# Patient Record
Sex: MALE | Race: WHITE | NOT HISPANIC OR LATINO | Employment: FULL TIME | URBAN - METROPOLITAN AREA
[De-identification: names, ages, dates, MRNs, and addresses within clinical notes are randomized per-mention and may not be internally consistent; named-entity substitution may affect disease eponyms.]

---

## 2017-11-17 ENCOUNTER — OFFICE VISIT (OUTPATIENT)
Dept: UROLOGY | Facility: CLINIC | Age: 37
End: 2017-11-17
Payer: COMMERCIAL

## 2017-11-17 VITALS
SYSTOLIC BLOOD PRESSURE: 134 MMHG | DIASTOLIC BLOOD PRESSURE: 82 MMHG | RESPIRATION RATE: 18 BRPM | BODY MASS INDEX: 24.76 KG/M2 | WEIGHT: 157.75 LBS | HEIGHT: 67 IN | HEART RATE: 73 BPM

## 2017-11-17 DIAGNOSIS — Z30.09 VISIT FOR VASECTOMY EVALUATION: Primary | ICD-10-CM

## 2017-11-17 PROCEDURE — 99203 OFFICE O/P NEW LOW 30 MIN: CPT | Mod: 57,S$GLB,, | Performed by: UROLOGY

## 2017-11-17 PROCEDURE — 99999 PR PBB SHADOW E&M-EST. PATIENT-LVL IV: CPT | Mod: PBBFAC,,, | Performed by: UROLOGY

## 2017-11-18 NOTE — PROGRESS NOTES
[facility] Urology New Patient/H&P:    Ismael Davis is a 36 y.o. male who presents for evaluation for vasectomy.     His wife is 33 years old, and they have been  for 8 years. They have 2 girls, ages 3.5 and ~6 mos.   His wife had multiple medical complications during her last pregnancy including low Mg, low K, Afib. She is still undergoing cardiac workup.  He has discussed vasectomy at length with his wife.     Prior colon surgery for Crohns but othwerwise no sig PMHx and follows up with Dr Hudson for primary care.    Past Medical History:   Diagnosis Date    Crohn's disease        Past Surgical History:   Procedure Laterality Date    ABDOMINAL SURGERY         History reviewed. No pertinent family history.    Social History     Social History    Marital status:      Spouse name: N/A    Number of children: N/A    Years of education: N/A     Occupational History    Not on file.     Social History Main Topics    Smoking status: Never Smoker    Smokeless tobacco: Never Used    Alcohol use No    Drug use: No    Sexual activity: Not on file     Other Topics Concern    Not on file     Social History Narrative    No narrative on file       Review of patient's allergies indicates:   Allergen Reactions    Penicillins        Medications Reviewed: see MAR    ROS:    Constitutional: denies fevers, chills, night sweats, fatigue, malaise  Respiratory: negative for cough, shortness of breath, wheezing, dyspnea.  Cardiovascular: negative for chest pain, varicose veins, ankle swelling, palpitations, syncope.  GI: negative for abdominal pain, heartburn, indigestion, nausea, vomiting, constipation, diarrhea, blood in stool.   Urology: as noted above in HPI  Endocrinology: negative for cold intolerance, excessive thirst, not feeling tired/sluggish, no heat intolerance.   Hematology/Lymph: negative for easy bleeding, easy bruising, swollen glands.  Musculoskeletal: negative for back pain, joint pain, joint  "swelling, neck pain.  Allergy-Immunology: negative for seasonal allergies, negative for unusual infections.   Skin: negative for boils, breast lumps, hives, itching, rash.   Neurology: negative for, dizziness, headache, tingling/numbness, tremors.   Psych: satisfied with life; negative for, anxiety, depression, suicidal thoughts.     PHYSICAL EXAM:    Vitals:    11/17/17 0926   BP: 134/82   Pulse: 73   Resp: 18     Body mass index is 24.71 kg/m². Weight: 71.6 kg (157 lb 11.8 oz) Height: 5' 7" (170.2 cm)       General: Alert, cooperative, no distress, appears stated age  Head: Normocephalic, without obvious abnormality, atraumatic  Neck: no masses, no thyromegaly, no lymphadenopathy  Eyes: PERRL, conjunctiva/corneas clear  Lungs: Respirations unlabored, normal effort, no accessory muscle use  CV: Warm and well perfused extremities  Abdomen: Soft, non-tender, no CVA tenderness, no hepatosplenomegaly, no hernia  Penis: phallus normal, circumcised, well cared for, no plaques or lesions.   Scrotum: no cysts, no lesions, no rash, no hydrocele. Prominently palpable bilateral vasa  Epididymes: normal, nontender, symmetrical, no masses or cysts.   Testes: normal, both descended, no masses.   Urethra: palpably normal with orthotopic meatus of normal size    Extremities: Extremities normal, atraumatic, no cyanosis or edema  Skin: Normal color, texture, and turgor, no rashes or lesions  Psych: Appropriate, well oriented, normal affect, normal mood  Neuro: Non-focal      Assessment/Diagnosis:    1. Visit for vasectomy evaluation  Diet NPO    Case Request Operating Room: VASECTOMY    Place in Outpatient    Vital Signs     Activity as tolerated    Insert peripheral IV    Diet NPO       Plans:  I discussed vasectomy in detail with Mr. Davis. This visit was about 80% spent in counseling, including procedure in detail, risks and beneifts, and expectations for post-vasectomy. Also had discussion about the time frame of approximately " 6-9 weeks on average, but up to 3-4 months to achieve a negative semenalysis before being cleared to discontinue other contraceptive methods. I did note if 2 semenalyses (6 wks and 8 wks) are negative he will be cleared to do so. Risks including but not limited to including pain, infection, bleeding hematoma, sperm granuloma, epididymitis, and recanalization were discussed, including discussion about maneuvers performed to eliminate risk of recanalization (excise segment, suture ligate and cauterize both ends, and bury one end at different fascial level in scrotum). All questions answered, pre-procedural instructions and postop expectations provided. Vasectomy scheduled at [facility] at 12/15/17

## 2017-11-27 ENCOUNTER — PATIENT MESSAGE (OUTPATIENT)
Dept: SURGERY | Facility: AMBULARY SURGERY CENTER | Age: 37
End: 2017-11-27

## 2017-12-14 ENCOUNTER — ANESTHESIA EVENT (OUTPATIENT)
Dept: SURGERY | Facility: AMBULARY SURGERY CENTER | Age: 37
End: 2017-12-14
Payer: COMMERCIAL

## 2017-12-15 ENCOUNTER — ANESTHESIA (OUTPATIENT)
Dept: SURGERY | Facility: AMBULARY SURGERY CENTER | Age: 37
End: 2017-12-15
Payer: COMMERCIAL

## 2017-12-15 ENCOUNTER — HOSPITAL ENCOUNTER (OUTPATIENT)
Facility: AMBULARY SURGERY CENTER | Age: 37
Discharge: HOME | End: 2017-12-15
Attending: UROLOGY | Admitting: UROLOGY
Payer: COMMERCIAL

## 2017-12-15 DIAGNOSIS — Z30.09 VISIT FOR VASECTOMY EVALUATION: ICD-10-CM

## 2017-12-15 PROCEDURE — 55250 REMOVAL OF SPERM DUCT(S): CPT | Performed by: UROLOGY

## 2017-12-15 PROCEDURE — D9220A PRA ANESTHESIA: Mod: CRNA,,, | Performed by: NURSE ANESTHETIST, CERTIFIED REGISTERED

## 2017-12-15 PROCEDURE — 88302 TISSUE EXAM BY PATHOLOGIST: CPT | Performed by: PATHOLOGY

## 2017-12-15 PROCEDURE — 55250 REMOVAL OF SPERM DUCT(S): CPT | Mod: ,,, | Performed by: UROLOGY

## 2017-12-15 PROCEDURE — D9220A PRA ANESTHESIA: Mod: ANES,,, | Performed by: ANESTHESIOLOGY

## 2017-12-15 RX ORDER — FENTANYL CITRATE 50 UG/ML
INJECTION, SOLUTION INTRAMUSCULAR; INTRAVENOUS
Status: COMPLETED
Start: 2017-12-15 | End: 2017-12-15

## 2017-12-15 RX ORDER — FENTANYL CITRATE 50 UG/ML
25 INJECTION, SOLUTION INTRAMUSCULAR; INTRAVENOUS EVERY 5 MIN PRN
Status: DISCONTINUED | OUTPATIENT
Start: 2017-12-15 | End: 2017-12-15 | Stop reason: HOSPADM

## 2017-12-15 RX ORDER — FENTANYL CITRATE 50 UG/ML
INJECTION, SOLUTION INTRAMUSCULAR; INTRAVENOUS
Status: DISCONTINUED | OUTPATIENT
Start: 2017-12-15 | End: 2017-12-15

## 2017-12-15 RX ORDER — MIDAZOLAM HYDROCHLORIDE 1 MG/ML
INJECTION, SOLUTION INTRAMUSCULAR; INTRAVENOUS
Status: DISCONTINUED | OUTPATIENT
Start: 2017-12-15 | End: 2017-12-15

## 2017-12-15 RX ORDER — SODIUM CHLORIDE, SODIUM LACTATE, POTASSIUM CHLORIDE, CALCIUM CHLORIDE 600; 310; 30; 20 MG/100ML; MG/100ML; MG/100ML; MG/100ML
INJECTION, SOLUTION INTRAVENOUS CONTINUOUS
Status: DISCONTINUED | OUTPATIENT
Start: 2017-12-15 | End: 2017-12-15 | Stop reason: HOSPADM

## 2017-12-15 RX ORDER — ONDANSETRON 2 MG/ML
4 INJECTION INTRAMUSCULAR; INTRAVENOUS ONCE AS NEEDED
Status: DISCONTINUED | OUTPATIENT
Start: 2017-12-15 | End: 2017-12-15 | Stop reason: HOSPADM

## 2017-12-15 RX ORDER — ONDANSETRON HYDROCHLORIDE 2 MG/ML
INJECTION, SOLUTION INTRAMUSCULAR; INTRAVENOUS
Status: DISCONTINUED | OUTPATIENT
Start: 2017-12-15 | End: 2017-12-15

## 2017-12-15 RX ORDER — ONDANSETRON 8 MG/1
8 TABLET, ORALLY DISINTEGRATING ORAL ONCE
Status: COMPLETED | OUTPATIENT
Start: 2017-12-15 | End: 2017-12-15

## 2017-12-15 RX ORDER — KETOROLAC TROMETHAMINE 30 MG/ML
INJECTION, SOLUTION INTRAMUSCULAR; INTRAVENOUS
Status: COMPLETED
Start: 2017-12-15 | End: 2017-12-15

## 2017-12-15 RX ORDER — SODIUM CHLORIDE, SODIUM LACTATE, POTASSIUM CHLORIDE, CALCIUM CHLORIDE 600; 310; 30; 20 MG/100ML; MG/100ML; MG/100ML; MG/100ML
1000 INJECTION, SOLUTION INTRAVENOUS ONCE
Status: DISCONTINUED | OUTPATIENT
Start: 2017-12-15 | End: 2017-12-15 | Stop reason: HOSPADM

## 2017-12-15 RX ORDER — LIDOCAINE HYDROCHLORIDE 20 MG/ML
INJECTION, SOLUTION EPIDURAL; INFILTRATION; INTRACAUDAL; PERINEURAL
Status: DISCONTINUED
Start: 2017-12-15 | End: 2017-12-15 | Stop reason: HOSPADM

## 2017-12-15 RX ORDER — KETOROLAC TROMETHAMINE 30 MG/ML
INJECTION, SOLUTION INTRAMUSCULAR; INTRAVENOUS
Status: DISCONTINUED | OUTPATIENT
Start: 2017-12-15 | End: 2017-12-15

## 2017-12-15 RX ORDER — LIDOCAINE HYDROCHLORIDE 10 MG/ML
1 INJECTION, SOLUTION EPIDURAL; INFILTRATION; INTRACAUDAL; PERINEURAL ONCE
Status: COMPLETED | OUTPATIENT
Start: 2017-12-15 | End: 2017-12-15

## 2017-12-15 RX ORDER — MIDAZOLAM HYDROCHLORIDE 1 MG/ML
INJECTION INTRAMUSCULAR; INTRAVENOUS
Status: COMPLETED
Start: 2017-12-15 | End: 2017-12-15

## 2017-12-15 RX ORDER — PROPOFOL 10 MG/ML
VIAL (ML) INTRAVENOUS
Status: DISCONTINUED | OUTPATIENT
Start: 2017-12-15 | End: 2017-12-15

## 2017-12-15 RX ORDER — DEXAMETHASONE SODIUM PHOSPHATE 4 MG/ML
INJECTION, SOLUTION INTRA-ARTICULAR; INTRALESIONAL; INTRAMUSCULAR; INTRAVENOUS; SOFT TISSUE
Status: COMPLETED
Start: 2017-12-15 | End: 2017-12-15

## 2017-12-15 RX ORDER — DEXAMETHASONE SODIUM PHOSPHATE 4 MG/ML
INJECTION, SOLUTION INTRA-ARTICULAR; INTRALESIONAL; INTRAMUSCULAR; INTRAVENOUS; SOFT TISSUE
Status: DISCONTINUED | OUTPATIENT
Start: 2017-12-15 | End: 2017-12-15

## 2017-12-15 RX ORDER — ONDANSETRON 2 MG/ML
INJECTION INTRAMUSCULAR; INTRAVENOUS
Status: COMPLETED
Start: 2017-12-15 | End: 2017-12-15

## 2017-12-15 RX ORDER — TRAMADOL HYDROCHLORIDE 50 MG/1
50 TABLET ORAL EVERY 6 HOURS PRN
Qty: 15 TABLET | Refills: 0 | Status: SHIPPED | OUTPATIENT
Start: 2017-12-15 | End: 2017-12-22

## 2017-12-15 RX ORDER — MUPIROCIN 20 MG/G
OINTMENT TOPICAL
Status: DISCONTINUED | OUTPATIENT
Start: 2017-12-15 | End: 2017-12-15 | Stop reason: HOSPADM

## 2017-12-15 RX ORDER — SULFAMETHOXAZOLE AND TRIMETHOPRIM 800; 160 MG/1; MG/1
1 TABLET ORAL 2 TIMES DAILY
Qty: 10 TABLET | Refills: 0 | Status: SHIPPED | OUTPATIENT
Start: 2017-12-15 | End: 2017-12-20

## 2017-12-15 RX ORDER — CLINDAMYCIN PHOSPHATE 900 MG/50ML
INJECTION, SOLUTION INTRAVENOUS
Status: COMPLETED
Start: 2017-12-15 | End: 2017-12-15

## 2017-12-15 RX ORDER — CLINDAMYCIN PHOSPHATE 900 MG/50ML
900 INJECTION, SOLUTION INTRAVENOUS
Status: COMPLETED | OUTPATIENT
Start: 2017-12-15 | End: 2017-12-15

## 2017-12-15 RX ORDER — PROPOFOL 10 MG/ML
INJECTION, EMULSION INTRAVENOUS
Status: COMPLETED
Start: 2017-12-15 | End: 2017-12-15

## 2017-12-15 RX ORDER — LIDOCAINE HCL/PF 100 MG/5ML
SYRINGE (ML) INTRAVENOUS
Status: DISCONTINUED | OUTPATIENT
Start: 2017-12-15 | End: 2017-12-15

## 2017-12-15 RX ADMIN — KETOROLAC TROMETHAMINE 30 MG: 30 INJECTION, SOLUTION INTRAMUSCULAR; INTRAVENOUS at 10:12

## 2017-12-15 RX ADMIN — ONDANSETRON 8 MG: 8 TABLET, ORALLY DISINTEGRATING ORAL at 11:12

## 2017-12-15 RX ADMIN — DEXAMETHASONE SODIUM PHOSPHATE 4 MG: 4 INJECTION, SOLUTION INTRA-ARTICULAR; INTRALESIONAL; INTRAMUSCULAR; INTRAVENOUS; SOFT TISSUE at 09:12

## 2017-12-15 RX ADMIN — FENTANYL CITRATE 50 MCG: 50 INJECTION, SOLUTION INTRAMUSCULAR; INTRAVENOUS at 09:12

## 2017-12-15 RX ADMIN — ONDANSETRON HYDROCHLORIDE 4 MG: 2 INJECTION, SOLUTION INTRAMUSCULAR; INTRAVENOUS at 09:12

## 2017-12-15 RX ADMIN — Medication 75 MG: at 09:12

## 2017-12-15 RX ADMIN — Medication 150 MG: at 09:12

## 2017-12-15 RX ADMIN — MIDAZOLAM HYDROCHLORIDE 2 MG: 1 INJECTION, SOLUTION INTRAMUSCULAR; INTRAVENOUS at 09:12

## 2017-12-15 RX ADMIN — CLINDAMYCIN PHOSPHATE 900 MG: 900 INJECTION, SOLUTION INTRAVENOUS at 09:12

## 2017-12-15 RX ADMIN — SODIUM CHLORIDE, SODIUM LACTATE, POTASSIUM CHLORIDE, CALCIUM CHLORIDE: 600; 310; 30; 20 INJECTION, SOLUTION INTRAVENOUS at 08:12

## 2017-12-15 RX ADMIN — LIDOCAINE HYDROCHLORIDE 0.2 ML: 10 INJECTION, SOLUTION EPIDURAL; INFILTRATION; INTRACAUDAL; PERINEURAL at 08:12

## 2017-12-15 NOTE — DISCHARGE INSTRUCTIONS
Anesthesia: After Your Surgery  Youve just had surgery. During surgery, you received medication called anesthesia to keep you comfortable and pain-free. After surgery, you may experience some pain or nausea. This is normal. Here are some tips for feeling better and recovering after surgery.         Stay on schedule with your medication.   Going Home  Your doctor or nurse will show you how to take care of yourself when you go home. He or she will also answer your questions. Have an adult family member or friend drive you home. For the first 24 hours after your surgery:  · Do not drive or use heavy equipment.  · Do not make important decisions or sign documents.  · Avoid alcohol.  · Have someone stay with you, if possible. They can watch for problems and help keep you safe.  Be sure to keep all follow-up doctors appointments. And rest after your procedure for as long as your doctor tells you to.  Coping with Pain  If you have pain after surgery, pain medication will help you feel better. Take it as directed, before pain becomes severe. Also, ask your doctor or pharmacist about other ways to control pain, such as with heat, ice, and relaxation. And follow any other instructions your surgeon or nurse gives you.  Tips for Taking Pain Medication  To get the best relief possible, remember these points:  · Pain medications can upset your stomach. Taking them with a little food may help.  · Most pain relievers taken by mouth need at least 20 to 30 minutes to take effect.  · Taking medication on a schedule can help you remember to take it. Try to time your medication so that you can take it before beginning an activity, such as dressing, walking, or sitting down for dinner.  · Constipation is a common side effect of pain medications. Drink lots of fluids. Eating fruit and vegetables can also help. Dont take laxatives unless your surgeon has prescribed them.  · Mixing alcohol and pain medication can cause dizziness and slow  your breathing. It can even be fatal. Dont drink alcohol while taking pain medication.  · Pain medication can slow your reflexes. Dont drive or operate machinery while taking pain medication.  Managing Nausea  Some people have an upset stomach after surgery. This is often due to anesthesia, pain, pain medications, or the stress of surgery. The following tips will help you manage nausea and get good nutrition as you recover. If you were on a special diet before surgery, ask your doctor if you should follow it during recovery. These tips may help:  · Dont push yourself to eat. Your body will tell you what to eat and when.  · Start off with liquids and soup. They are easier to digest.  · Progress to semisolids (mashed potatoes, applesauce, and gelatin) as you feel ready.  · Slowly move to solid foods. Dont eat fatty, rich, or spicy foods at first.  · Dont force yourself to have three large meals a day. Instead, eat smaller amounts more often.  · Take pain medications with a small amount of solid food, such as crackers or toast.  Call Your Surgeon If  · You still have pain an hour after taking medication (it may not be strong enough).  · You feel too sleepy, dizzy, or groggy (medication may be too strong).  · You have side effects like nausea, vomiting, or skin changes (rash, itching, or hives).

## 2017-12-15 NOTE — TRANSFER OF CARE
"Anesthesia Transfer of Care Note    Patient: Ismael Davis    Procedure(s) Performed: Procedure(s) (LRB):  VASECTOMY (Bilateral)    Patient location: PACU    Anesthesia Type: general    Transport from OR: Transported from OR on room air with adequate spontaneous ventilation    Post pain: adequate analgesia    Post assessment: no apparent anesthetic complications and tolerated procedure well    Post vital signs: stable    Level of consciousness: awake, alert and oriented    Nausea/Vomiting: no nausea/vomiting    Complications: none    Transfer of care protocol was followed      Last vitals:   Visit Vitals  /75 (BP Location: Right arm, Patient Position: Lying)   Pulse 68   Temp 36.6 °C (97.8 °F) (Oral)   Resp 18   Ht 5' 7" (1.702 m)   Wt 71.2 kg (157 lb)   SpO2 100%   BMI 24.59 kg/m²     "

## 2017-12-15 NOTE — OP NOTE
VASECTOMY Operative Report/Brief Discharge Note     Surgery Date: 12/15/17     SURGEON: Dr. Zhang Liao     DIAGNOSIS: Desired sterilization.     OPERATION: Bilateral vastectomy.     TECHNIQUE: 2 incisions using scalpel/sharp hemostat overlying vas upper lateral scrotum. Ring clamp used to isolate vas deferens and perivasal tissue dissected free with sharp hemostat and metzenbaum scissors until only vas deferens isolated by ring forceps. Vasculature inferior dissected free and an approximate 1 inch segment was isolated with 2 hemostats, and transected with bovie electrocautery which was used as well to cauterize the stump lumen. 2-0 chromic stick tie used to suture ligate the stumps, and then to bury one end. Assessed for bleeding, spot cautery used, and noted to be hemostatic. Skin incisions closed with 4-0 chromic figure of eight suture.      ANESTHESIA: general lma  FINDINGS: Normal vas bilaterally.  COMPLICATIONS: None  EBL: none  SPECIMENS: 1. Right vas deferens, 2. Left vas deferens     DISCHARGE:  DISPO: Home today   PRECAUTION DISCUSSED: Rest, ice pack, scrotal support, no ejaculation for 3 days, activity restrictions, and use protected intercourse until negative semenalyses. May shower in 24 hours, no baths/soaking.  DIET - resume regular diet  FOLLOW UP - 1-2 weeks postop check, then 6 weeks for first semenalysis   POST OP MEDICATIONS: bactrim bid and tramadol + home meds

## 2017-12-15 NOTE — PLAN OF CARE
Patient is being driven home by his wife. He was sent home with a reusable ice pack. Nausea improved.

## 2017-12-15 NOTE — H&P (VIEW-ONLY)
[facility] Urology New Patient/H&P:    Ismael Davis is a 36 y.o. male who presents for evaluation for vasectomy.     His wife is 33 years old, and they have been  for 8 years. They have 2 girls, ages 3.5 and ~6 mos.   His wife had multiple medical complications during her last pregnancy including low Mg, low K, Afib. She is still undergoing cardiac workup.  He has discussed vasectomy at length with his wife.     Prior colon surgery for Crohns but othwerwise no sig PMHx and follows up with Dr Hudson for primary care.    Past Medical History:   Diagnosis Date    Crohn's disease        Past Surgical History:   Procedure Laterality Date    ABDOMINAL SURGERY         History reviewed. No pertinent family history.    Social History     Social History    Marital status:      Spouse name: N/A    Number of children: N/A    Years of education: N/A     Occupational History    Not on file.     Social History Main Topics    Smoking status: Never Smoker    Smokeless tobacco: Never Used    Alcohol use No    Drug use: No    Sexual activity: Not on file     Other Topics Concern    Not on file     Social History Narrative    No narrative on file       Review of patient's allergies indicates:   Allergen Reactions    Penicillins        Medications Reviewed: see MAR    ROS:    Constitutional: denies fevers, chills, night sweats, fatigue, malaise  Respiratory: negative for cough, shortness of breath, wheezing, dyspnea.  Cardiovascular: negative for chest pain, varicose veins, ankle swelling, palpitations, syncope.  GI: negative for abdominal pain, heartburn, indigestion, nausea, vomiting, constipation, diarrhea, blood in stool.   Urology: as noted above in HPI  Endocrinology: negative for cold intolerance, excessive thirst, not feeling tired/sluggish, no heat intolerance.   Hematology/Lymph: negative for easy bleeding, easy bruising, swollen glands.  Musculoskeletal: negative for back pain, joint pain, joint  "swelling, neck pain.  Allergy-Immunology: negative for seasonal allergies, negative for unusual infections.   Skin: negative for boils, breast lumps, hives, itching, rash.   Neurology: negative for, dizziness, headache, tingling/numbness, tremors.   Psych: satisfied with life; negative for, anxiety, depression, suicidal thoughts.     PHYSICAL EXAM:    Vitals:    11/17/17 0926   BP: 134/82   Pulse: 73   Resp: 18     Body mass index is 24.71 kg/m². Weight: 71.6 kg (157 lb 11.8 oz) Height: 5' 7" (170.2 cm)       General: Alert, cooperative, no distress, appears stated age  Head: Normocephalic, without obvious abnormality, atraumatic  Neck: no masses, no thyromegaly, no lymphadenopathy  Eyes: PERRL, conjunctiva/corneas clear  Lungs: Respirations unlabored, normal effort, no accessory muscle use  CV: Warm and well perfused extremities  Abdomen: Soft, non-tender, no CVA tenderness, no hepatosplenomegaly, no hernia  Penis: phallus normal, circumcised, well cared for, no plaques or lesions.   Scrotum: no cysts, no lesions, no rash, no hydrocele. Prominently palpable bilateral vasa  Epididymes: normal, nontender, symmetrical, no masses or cysts.   Testes: normal, both descended, no masses.   Urethra: palpably normal with orthotopic meatus of normal size    Extremities: Extremities normal, atraumatic, no cyanosis or edema  Skin: Normal color, texture, and turgor, no rashes or lesions  Psych: Appropriate, well oriented, normal affect, normal mood  Neuro: Non-focal      Assessment/Diagnosis:    1. Visit for vasectomy evaluation  Diet NPO    Case Request Operating Room: VASECTOMY    Place in Outpatient    Vital Signs     Activity as tolerated    Insert peripheral IV    Diet NPO       Plans:  I discussed vasectomy in detail with Mr. Davis. This visit was about 80% spent in counseling, including procedure in detail, risks and beneifts, and expectations for post-vasectomy. Also had discussion about the time frame of approximately " 6-9 weeks on average, but up to 3-4 months to achieve a negative semenalysis before being cleared to discontinue other contraceptive methods. I did note if 2 semenalyses (6 wks and 8 wks) are negative he will be cleared to do so. Risks including but not limited to including pain, infection, bleeding hematoma, sperm granuloma, epididymitis, and recanalization were discussed, including discussion about maneuvers performed to eliminate risk of recanalization (excise segment, suture ligate and cauterize both ends, and bury one end at different fascial level in scrotum). All questions answered, pre-procedural instructions and postop expectations provided. Vasectomy scheduled at [facility] at 12/15/17

## 2017-12-15 NOTE — ANESTHESIA PREPROCEDURE EVALUATION
12/15/2017  Ismael Davis is a 37 y.o., male.    Anesthesia Evaluation    I have reviewed the Patient Summary Reports.    I have reviewed the Nursing Notes.   I have reviewed the Medications.     Review of Systems  Anesthesia Hx:  No problems with previous Anesthesia    Social:  Non-Smoker    Hematology/Oncology:  Hematology Normal   Oncology Normal     EENT/Dental:EENT/Dental Normal   Cardiovascular:  Cardiovascular Normal     Pulmonary:  Pulmonary Normal    Renal/:  Renal/ Normal     Hepatic/GI:   Crohn's disease    Musculoskeletal:  Musculoskeletal Normal    Neurological:  Neurology Normal    Endocrine:  Endocrine Normal    Dermatological:  Skin Normal    Psych:  Psychiatric Normal           Physical Exam  General:  Well nourished    Airway/Jaw/Neck:  Airway Findings: Mouth Opening: Normal Tongue: Normal  General Airway Assessment: Adult  Mallampati: I        Eyes/Ears/Nose:  EYES/EARS/NOSE FINDINGS: Normal   Dental:  DENTAL FINDINGS: Normal   Chest/Lungs:  Chest/Lungs Findings: Clear to auscultation, Normal Respiratory Rate     Heart/Vascular:  Heart Findings: Rate: Normal  Rhythm: Regular Rhythm  Sounds: Normal        Mental Status:  Mental Status Findings: Normal        Anesthesia Plan  Type of Anesthesia, risks & benefits discussed:  Anesthesia Type:  general  Patient's Preference:   Intra-op Monitoring Plan: standard ASA monitors  Intra-op Monitoring Plan Comments:   Post Op Pain Control Plan: IV/PO Opioids PRN  Post Op Pain Control Plan Comments:   Induction:   IV  Beta Blocker:  Patient is not currently on a Beta-Blocker (No further documentation required).       Informed Consent: Patient understands risks and agrees with Anesthesia plan.  Questions answered. Anesthesia consent signed with patient.  ASA Score: 2     Day of Surgery Review of History & Physical: I have interviewed and  examined the patient. I have reviewed the patient's H&P dated:  There are no significant changes.          Ready For Surgery From Anesthesia Perspective.

## 2017-12-15 NOTE — INTERVAL H&P NOTE
The patient has been examined and the H&P has been reviewed:    No changes to above  All questions patient and wife had were answered in preop holding  Patient is acceptable candidate for procedure at [facility]  Proceed with vasectomy    Anesthesia/Surgery risks, benefits and alternative options discussed and understood by patient/family.          Active Hospital Problems    Diagnosis  POA    Visit for vasectomy evaluation [Z30.09]  Yes      Resolved Hospital Problems    Diagnosis Date Resolved POA   No resolved problems to display.

## 2017-12-15 NOTE — ANESTHESIA POSTPROCEDURE EVALUATION
"Anesthesia Post Evaluation    Patient: Ismael Davis    Procedure(s) Performed: Procedure(s) (LRB):  VASECTOMY (Bilateral)    Final Anesthesia Type: general  Patient location during evaluation: PACU  Patient participation: Yes- Able to Participate  Level of consciousness: awake and alert  Post-procedure vital signs: reviewed and stable  Pain management: adequate  Airway patency: patent  PONV status at discharge: No PONV  Anesthetic complications: no      Cardiovascular status: hemodynamically stable  Respiratory status: unassisted and room air  Hydration status: euvolemic  Follow-up not needed.        Visit Vitals  BP (!) 98/56   Pulse 75   Temp 36.7 °C (98 °F) (Skin)   Resp 13   Ht 5' 7" (1.702 m)   Wt 71.2 kg (157 lb)   SpO2 98%   BMI 24.59 kg/m²       Pain/Atif Score: Pain Assessment Performed: Yes (12/15/2017 10:50 AM)  Presence of Pain: denies (12/15/2017 10:50 AM)  Atif Score: 10 (12/15/2017 10:50 AM)      "

## 2017-12-18 VITALS
DIASTOLIC BLOOD PRESSURE: 79 MMHG | TEMPERATURE: 98 F | OXYGEN SATURATION: 97 % | HEIGHT: 67 IN | HEART RATE: 70 BPM | SYSTOLIC BLOOD PRESSURE: 119 MMHG | RESPIRATION RATE: 15 BRPM | BODY MASS INDEX: 24.64 KG/M2 | WEIGHT: 157 LBS

## 2017-12-22 ENCOUNTER — OFFICE VISIT (OUTPATIENT)
Dept: UROLOGY | Facility: CLINIC | Age: 37
End: 2017-12-22
Payer: COMMERCIAL

## 2017-12-22 VITALS
SYSTOLIC BLOOD PRESSURE: 131 MMHG | HEART RATE: 82 BPM | BODY MASS INDEX: 24.63 KG/M2 | HEIGHT: 67 IN | WEIGHT: 156.94 LBS | DIASTOLIC BLOOD PRESSURE: 83 MMHG | RESPIRATION RATE: 18 BRPM

## 2017-12-22 DIAGNOSIS — Z98.52 S/P VASECTOMY: Primary | ICD-10-CM

## 2017-12-22 PROCEDURE — 99024 POSTOP FOLLOW-UP VISIT: CPT | Mod: S$GLB,,, | Performed by: UROLOGY

## 2017-12-22 PROCEDURE — 99999 PR PBB SHADOW E&M-EST. PATIENT-LVL III: CPT | Mod: PBBFAC,,, | Performed by: UROLOGY

## 2017-12-23 NOTE — PROGRESS NOTES
"[facility] Urology Progress Note    Ismael Davis is a 37 y.o. male who presents for one week postop follow-up from vasectomy on 12/15/17    Minimal postoperative discomfort  Minimal swelling which resolved within 3 days  Return to normal activity level  Continued scrotal support  Completed 1 week postoperative antibiotics  Has ejaculated without pain  Pathology confirms bilateral vasal segments completely transected  Denies fever, chills, hematuria, dysuria, pain        ROS: A comprehensive 10 system review was performed and is negative except as noted above in HPI    PHYSICAL EXAM:    Vitals:    12/22/17 0903   BP: 131/83   Pulse: 82   Resp: 18     Body mass index is 24.58 kg/m². Weight: 71.2 kg (156 lb 15.5 oz) Height: 5' 7" (170.2 cm)       General: Alert, cooperative, no distress, appears stated age   Head: Normocephalic, without obvious abnormality, atraumatic   Eyes: PERRL, conjunctiva/corneas clear   Lungs: Respirations unlabored   Heart: Warm and well perfused   Abdomen: Soft, NT, N.D.   : Normal phallus, bilaterally descended topically normal testes and normal scrotum with bilateral upper scrotal incisions well-healed.  No erythema, no edema, no hematoma  Extremities: Extremities normal, atraumatic, no cyanosis or edema   Skin: Skin color, texture, turgor normal, no rashes or lesions   Psych: Appropriate   Neurologic: Non-focal       No results found for this or any previous visit (from the past 336 hour(s)).    ASSESSMENT   1. S/P vasectomy  Sperm Count, Post Vasectomy       Plan    He is doing well status post vasectomy.  He had minimal to no discomfort or swelling has returned to normal activity level without incident.  We again reviewed that he'll have his first semen analysis 6 weeks postoperatively, approximately 1/26/18, at which time if no sperm are detected he will return in 2 weeks later at 8 weeks postoperatively for a second semen analysis.  He can otherwise follow up when necessary  "

## 2018-01-29 ENCOUNTER — LAB VISIT (OUTPATIENT)
Dept: LAB | Facility: HOSPITAL | Age: 38
End: 2018-01-29
Attending: UROLOGY
Payer: COMMERCIAL

## 2018-01-29 DIAGNOSIS — Z98.52 S/P VASECTOMY: ICD-10-CM

## 2018-01-29 LAB
SPECIMEN VOL SMN: 1.6 ML
SPERM P VAS # SMN: 0 M/ML

## 2018-01-29 PROCEDURE — 89320 SEMEN ANAL VOL/COUNT/MOT: CPT

## 2018-01-30 ENCOUNTER — PATIENT MESSAGE (OUTPATIENT)
Dept: UROLOGY | Facility: CLINIC | Age: 38
End: 2018-01-30

## 2018-01-31 DIAGNOSIS — Z98.52 S/P VASECTOMY: Primary | ICD-10-CM

## 2018-02-12 ENCOUNTER — LAB VISIT (OUTPATIENT)
Dept: LAB | Facility: HOSPITAL | Age: 38
End: 2018-02-12
Attending: UROLOGY
Payer: COMMERCIAL

## 2018-02-12 DIAGNOSIS — Z98.52 S/P VASECTOMY: ICD-10-CM

## 2018-02-12 LAB
SPECIMEN VOL SMN: 0.8 ML
SPERM P VAS # SMN: 0 M/ML

## 2018-02-12 PROCEDURE — 89320 SEMEN ANAL VOL/COUNT/MOT: CPT

## 2018-02-16 ENCOUNTER — PATIENT MESSAGE (OUTPATIENT)
Dept: UROLOGY | Facility: CLINIC | Age: 38
End: 2018-02-16

## 2018-02-16 DIAGNOSIS — Z98.52 S/P VASECTOMY: Primary | ICD-10-CM

## 2018-02-20 ENCOUNTER — TELEPHONE (OUTPATIENT)
Dept: UROLOGY | Facility: CLINIC | Age: 38
End: 2018-02-20

## 2018-02-20 NOTE — TELEPHONE ENCOUNTER
Pt will come tomorrow to clinic and p/u specimen cup for 3rd semen sample. Cup is labeled and in bag ready for p/u.

## 2018-02-27 ENCOUNTER — PATIENT MESSAGE (OUTPATIENT)
Dept: UROLOGY | Facility: CLINIC | Age: 38
End: 2018-02-27

## 2018-02-27 ENCOUNTER — LAB VISIT (OUTPATIENT)
Dept: LAB | Facility: HOSPITAL | Age: 38
End: 2018-02-27
Attending: UROLOGY
Payer: COMMERCIAL

## 2018-02-27 DIAGNOSIS — Z98.52 S/P VASECTOMY: ICD-10-CM

## 2018-02-27 LAB
SPECIMEN VOL SMN: 0.4 ML
SPERM P VAS # SMN: 0 M/ML

## 2018-02-27 PROCEDURE — 89320 SEMEN ANAL VOL/COUNT/MOT: CPT

## 2022-12-13 ENCOUNTER — OFFICE VISIT (OUTPATIENT)
Dept: URGENT CARE | Facility: CLINIC | Age: 42
End: 2022-12-13
Payer: COMMERCIAL

## 2022-12-13 VITALS
WEIGHT: 156 LBS | RESPIRATION RATE: 16 BRPM | OXYGEN SATURATION: 95 % | SYSTOLIC BLOOD PRESSURE: 106 MMHG | HEART RATE: 104 BPM | DIASTOLIC BLOOD PRESSURE: 76 MMHG | TEMPERATURE: 100 F | BODY MASS INDEX: 24.43 KG/M2

## 2022-12-13 DIAGNOSIS — R52 BODY ACHES: ICD-10-CM

## 2022-12-13 DIAGNOSIS — B34.9 ACUTE VIRAL SYNDROME: Primary | ICD-10-CM

## 2022-12-13 DIAGNOSIS — H65.03 NON-RECURRENT ACUTE SEROUS OTITIS MEDIA OF BOTH EARS: ICD-10-CM

## 2022-12-13 DIAGNOSIS — R50.9 FEVER AND CHILLS: ICD-10-CM

## 2022-12-13 DIAGNOSIS — R06.7 SNEEZING: ICD-10-CM

## 2022-12-13 DIAGNOSIS — R51.9 SINUS HEADACHE: ICD-10-CM

## 2022-12-13 PROCEDURE — 99204 PR OFFICE/OUTPT VISIT, NEW, LEVL IV, 45-59 MIN: ICD-10-PCS | Mod: S$GLB,,, | Performed by: NURSE PRACTITIONER

## 2022-12-13 PROCEDURE — 99204 OFFICE O/P NEW MOD 45 MIN: CPT | Mod: S$GLB,,, | Performed by: NURSE PRACTITIONER

## 2022-12-13 RX ORDER — FLUTICASONE PROPIONATE 50 MCG
1 SPRAY, SUSPENSION (ML) NASAL DAILY
Qty: 18 G | Refills: 0 | Status: SHIPPED | OUTPATIENT
Start: 2022-12-13 | End: 2022-12-17

## 2022-12-13 RX ORDER — PHENIRAMINE MALEATE, PHENYLEPHRINE HCL 17.5; 1 MG/1; MG/1
1 TABLET ORAL EVERY 4 HOURS PRN
Qty: 28 TABLET | Refills: 0 | Status: SHIPPED | OUTPATIENT
Start: 2022-12-13 | End: 2022-12-17

## 2022-12-13 NOTE — LETTER
December 13, 2022      [facility] [place]  [address]  [place] [address]  Phone: [phone]  Fax: [phone]       Patient: Ismael Davis   YOB: 1980  Date of Visit: 12/13/2022    To Whom It May Concern:    Mey Davis  was at [facility] on 12/13/2022. The patient may return to work/school on 12/17/22 with no restrictions. If you have any questions or concerns, or if I can be of further assistance, please do not hesitate to contact me.    Sincerely,    Megan Cunningham NP

## 2022-12-13 NOTE — PROGRESS NOTES
Subjective:       Patient ID: Ismael Davis is a 42 y.o. male.    Vitals:  weight is 70.8 kg (156 lb). His oral temperature is 100.2 °F (37.9 °C). His blood pressure is 106/76 and his pulse is 104. His respiration is 16 and oxygen saturation is 95%.     Chief Complaint: Fever    Ismael Davis presents to clinic with fever, headache, body aches, Chills and dizziness, sneezing since Sunday evening (3 days). Pt does not want POC testing.     Fever   This is a new problem. The current episode started in the past 7 days. The problem occurs constantly. The problem has been unchanged. The maximum temperature noted was 101 to 101.9 F. The temperature was taken using an oral thermometer. Associated symptoms include headaches and muscle aches. He has tried acetaminophen and NSAIDs for the symptoms.     Constitution: Positive for chills and fever.   HENT: Negative.     Neck: neck negative.   Cardiovascular: Negative.    Eyes: Negative.    Respiratory: Negative.     Gastrointestinal: Negative.    Endocrine: negative.   Genitourinary: Negative.    Musculoskeletal:  Positive for muscle ache.   Skin: Negative.    Neurological:  Positive for headaches.     Objective:      Physical Exam   Constitutional: He is oriented to person, place, and time. He appears well-developed. He is cooperative.  Non-toxic appearance. He does not appear ill. No distress.   HENT:   Head: Normocephalic and atraumatic.   Ears:   Right Ear: Hearing, external ear and ear canal normal. A middle ear effusion is present.   Left Ear: Hearing, external ear and ear canal normal. A middle ear effusion is present.   Nose: Nose normal. No mucosal edema, rhinorrhea or nasal deformity. No epistaxis. Right sinus exhibits no maxillary sinus tenderness and no frontal sinus tenderness. Left sinus exhibits no maxillary sinus tenderness and no frontal sinus tenderness.   Mouth/Throat: Uvula is midline and mucous membranes are normal. No trismus in the jaw. Normal  dentition. No uvula swelling. Posterior oropharyngeal erythema present. No oropharyngeal exudate or posterior oropharyngeal edema.   Eyes: Conjunctivae and lids are normal. No scleral icterus.   Neck: Trachea normal and phonation normal. Neck supple. No edema present. No erythema present. No neck rigidity present.   Cardiovascular: Normal rate, regular rhythm, normal heart sounds and normal pulses.   Pulmonary/Chest: Effort normal and breath sounds normal. No respiratory distress. He has no decreased breath sounds. He has no rhonchi.   Abdominal: Normal appearance.   Musculoskeletal: Normal range of motion.         General: No deformity. Normal range of motion.   Lymphadenopathy:     He has cervical adenopathy.        Right cervical: Superficial cervical adenopathy present.        Left cervical: Superficial cervical adenopathy present.   Neurological: He is alert and oriented to person, place, and time. He exhibits normal muscle tone. Coordination normal.   Skin: Skin is warm, dry, intact, not diaphoretic and not pale.   Psychiatric: His speech is normal and behavior is normal. Judgment and thought content normal.   Nursing note and vitals reviewed.      Assessment:       1. Acute viral syndrome    2. Fever and chills    3. Sinus headache    4. Body aches    5. Sneezing    6. Non-recurrent acute serous otitis media of both ears          Plan:         Acute viral syndrome  -     phenylephrine-pheniramine (ALAHIST D) 10-17.5 mg Tab; Take 1 tablet by mouth every 4 (four) hours as needed.  Dispense: 28 tablet; Refill: 0  -     fluticasone propionate (FLONASE) 50 mcg/actuation nasal spray; 1 spray (50 mcg total) by Each Nostril route once daily.  Dispense: 18 g; Refill: 0    Fever and chills  -     phenylephrine-pheniramine (ALAHIST D) 10-17.5 mg Tab; Take 1 tablet by mouth every 4 (four) hours as needed.  Dispense: 28 tablet; Refill: 0  -     fluticasone propionate (FLONASE) 50 mcg/actuation nasal spray; 1 spray (50 mcg  total) by Each Nostril route once daily.  Dispense: 18 g; Refill: 0    Sinus headache  -     phenylephrine-pheniramine (ALAHIST D) 10-17.5 mg Tab; Take 1 tablet by mouth every 4 (four) hours as needed.  Dispense: 28 tablet; Refill: 0  -     fluticasone propionate (FLONASE) 50 mcg/actuation nasal spray; 1 spray (50 mcg total) by Each Nostril route once daily.  Dispense: 18 g; Refill: 0    Body aches  -     phenylephrine-pheniramine (ALAHIST D) 10-17.5 mg Tab; Take 1 tablet by mouth every 4 (four) hours as needed.  Dispense: 28 tablet; Refill: 0  -     fluticasone propionate (FLONASE) 50 mcg/actuation nasal spray; 1 spray (50 mcg total) by Each Nostril route once daily.  Dispense: 18 g; Refill: 0    Sneezing  -     phenylephrine-pheniramine (ALAHIST D) 10-17.5 mg Tab; Take 1 tablet by mouth every 4 (four) hours as needed.  Dispense: 28 tablet; Refill: 0  -     fluticasone propionate (FLONASE) 50 mcg/actuation nasal spray; 1 spray (50 mcg total) by Each Nostril route once daily.  Dispense: 18 g; Refill: 0    Non-recurrent acute serous otitis media of both ears  -     phenylephrine-pheniramine (ALAHIST D) 10-17.5 mg Tab; Take 1 tablet by mouth every 4 (four) hours as needed.  Dispense: 28 tablet; Refill: 0  -     fluticasone propionate (FLONASE) 50 mcg/actuation nasal spray; 1 spray (50 mcg total) by Each Nostril route once daily.  Dispense: 18 g; Refill: 0

## 2022-12-17 ENCOUNTER — OFFICE VISIT (OUTPATIENT)
Dept: URGENT CARE | Facility: CLINIC | Age: 42
End: 2022-12-17
Payer: COMMERCIAL

## 2022-12-17 VITALS
DIASTOLIC BLOOD PRESSURE: 82 MMHG | HEART RATE: 79 BPM | HEIGHT: 68 IN | BODY MASS INDEX: 25.01 KG/M2 | TEMPERATURE: 98 F | SYSTOLIC BLOOD PRESSURE: 126 MMHG | OXYGEN SATURATION: 99 % | RESPIRATION RATE: 18 BRPM | WEIGHT: 165 LBS

## 2022-12-17 DIAGNOSIS — J02.9 SORE THROAT: Primary | ICD-10-CM

## 2022-12-17 LAB
CTP QC/QA: YES
FLUAV AG NPH QL: NEGATIVE
FLUBV AG NPH QL: NEGATIVE
S PYO RRNA THROAT QL PROBE: NEGATIVE
SARS-COV-2 AG RESP QL IA.RAPID: POSITIVE

## 2022-12-17 PROCEDURE — 87811 SARS CORONAVIRUS 2 ANTIGEN POCT, MANUAL READ: ICD-10-PCS | Mod: QW,S$GLB,, | Performed by: NURSE PRACTITIONER

## 2022-12-17 PROCEDURE — 87880 STREP A ASSAY W/OPTIC: CPT | Mod: QW,,, | Performed by: NURSE PRACTITIONER

## 2022-12-17 PROCEDURE — 87804 INFLUENZA ASSAY W/OPTIC: CPT | Mod: QW,,, | Performed by: NURSE PRACTITIONER

## 2022-12-17 PROCEDURE — 99214 OFFICE O/P EST MOD 30 MIN: CPT | Mod: S$GLB,,, | Performed by: NURSE PRACTITIONER

## 2022-12-17 PROCEDURE — 87880 POCT RAPID STREP A: ICD-10-PCS | Mod: QW,,, | Performed by: NURSE PRACTITIONER

## 2022-12-17 PROCEDURE — 99214 PR OFFICE/OUTPT VISIT, EST, LEVL IV, 30-39 MIN: ICD-10-PCS | Mod: S$GLB,,, | Performed by: NURSE PRACTITIONER

## 2022-12-17 PROCEDURE — 87811 SARS-COV-2 COVID19 W/OPTIC: CPT | Mod: QW,S$GLB,, | Performed by: NURSE PRACTITIONER

## 2022-12-17 PROCEDURE — 87804 POCT INFLUENZA A/B: ICD-10-PCS | Mod: 59,QW,, | Performed by: NURSE PRACTITIONER

## 2022-12-17 NOTE — PROGRESS NOTES
"Subjective:       Patient ID: Ismael Davis is a 42 y.o. male.    Vitals:  height is 5' 8" (1.727 m) and weight is 74.8 kg (165 lb). His oral temperature is 98.1 °F (36.7 °C). His blood pressure is 126/82 and his pulse is 79. His respiration is 18 and oxygen saturation is 99%.     Chief Complaint: Sore Throat (Sore throat since 12/14/22)    Sore Throat    42-year-old male presents to clinic for evaluation of sore throat.  Symptoms onset Monday.  He was tested negative for influenza and COVID earlier in the week.  He denies any fever or chills in the past 48 hours.  Denies any shortness of breath cough, chest pain.  Does complain of mild sore throat.  No known sick contacts or travel.  He is not vaccinated for COVID.    Constitution: Negative.   HENT:  Positive for sore throat.    Neck: neck negative.   Cardiovascular: Negative.    Gastrointestinal: Negative.    Musculoskeletal: Negative.    Skin: Negative.  Negative for erythema.   Psychiatric/Behavioral: Negative.       Objective:      Physical Exam   Constitutional: He is oriented to person, place, and time.  Non-toxic appearance. No distress. normal  HENT:   Head: Normocephalic.   Ears:   Right Ear: Tympanic membrane normal.   Left Ear: Tympanic membrane normal.   Nose: Rhinorrhea and congestion present.   Mouth/Throat: Mucous membranes are moist. Oropharynx is clear.   Eyes: Pupils are equal, round, and reactive to light.   Cardiovascular: Normal rate and regular rhythm.   Pulmonary/Chest: Effort normal and breath sounds normal. No respiratory distress. He has no wheezes. He has no rales.   Abdominal: Normal appearance. Soft. There is no abdominal tenderness.   Musculoskeletal: Normal range of motion.         General: Normal range of motion.   Neurological: no focal deficit. He is alert and oriented to person, place, and time.   Skin: Skin is warm, dry and no rash. Capillary refill takes less than 2 seconds. No erythema   Psychiatric: His behavior is " normal. Mood, judgment and thought content normal.   Nursing note and vitals reviewed.      Assessment:       1. Sore throat            Plan:       COVID positive   Supportive and symptomatic care   ER for worsening of symptoms     Sore throat  -     POCT rapid strep A  -     POCT Influenza A/B  -     SARS Coronavirus 2 Antigen, POCT Manual Read

## (undated) DEVICE — DRESSING TELFA PAD N ADH 2X3

## (undated) DEVICE — SUPPORTER ATHLETIC X-LARGE

## (undated) DEVICE — SPONGE DERMA 8PLY 2X2